# Patient Record
Sex: MALE | URBAN - METROPOLITAN AREA
[De-identification: names, ages, dates, MRNs, and addresses within clinical notes are randomized per-mention and may not be internally consistent; named-entity substitution may affect disease eponyms.]

---

## 2018-05-30 ENCOUNTER — OFFICE VISIT (OUTPATIENT)
Dept: INFECTIOUS DISEASES | Facility: CLINIC | Age: 68
End: 2018-05-30
Attending: INTERNAL MEDICINE
Payer: MEDICARE

## 2018-05-30 VITALS
HEIGHT: 74 IN | OXYGEN SATURATION: 95 % | WEIGHT: 266 LBS | TEMPERATURE: 98 F | RESPIRATION RATE: 18 BRPM | BODY MASS INDEX: 34.14 KG/M2 | SYSTOLIC BLOOD PRESSURE: 128 MMHG | DIASTOLIC BLOOD PRESSURE: 84 MMHG | HEART RATE: 63 BPM

## 2018-05-30 DIAGNOSIS — Z29.89 NEED FOR MALARIA PROPHYLAXIS: Primary | ICD-10-CM

## 2018-05-30 DIAGNOSIS — Z79.2 ENCOUNTER FOR LONG-TERM (CURRENT) USE OF ANTIBIOTICS: ICD-10-CM

## 2018-05-30 PROCEDURE — G0463 HOSPITAL OUTPT CLINIC VISIT: HCPCS | Mod: ZF

## 2018-05-30 RX ORDER — UBIDECARENONE 100 MG
100 CAPSULE ORAL DAILY
COMMUNITY
Start: 2016-10-11

## 2018-05-30 RX ORDER — ATOVAQUONE AND PROGUANIL HYDROCHLORIDE 250; 100 MG/1; MG/1
1 TABLET, FILM COATED ORAL DAILY
Qty: 24 TABLET | Refills: 0 | Status: SHIPPED | OUTPATIENT
Start: 2018-05-30 | End: 2018-06-23

## 2018-05-30 RX ORDER — ASPIRIN 81 MG/1
81 TABLET ORAL DAILY
COMMUNITY
Start: 2007-05-31

## 2018-05-30 RX ORDER — ACETAMINOPHEN AND CODEINE PHOSPHATE 120; 12 MG/5ML; MG/5ML
1 SOLUTION ORAL DAILY
COMMUNITY
Start: 2016-12-13

## 2018-05-30 RX ORDER — IBUPROFEN 800 MG/1
800 TABLET, FILM COATED ORAL EVERY 6 HOURS PRN
COMMUNITY
Start: 2016-07-05

## 2018-05-30 RX ORDER — ACETAMINOPHEN AND CODEINE PHOSPHATE 120; 12 MG/5ML; MG/5ML
1 SOLUTION ORAL
COMMUNITY
Start: 2010-11-10

## 2018-05-30 RX ORDER — DOXYCYCLINE 100 MG/1
100 CAPSULE ORAL DAILY
COMMUNITY
Start: 2018-01-15

## 2018-05-30 RX ORDER — VITAMIN B COMPLEX
1 TABLET ORAL DAILY
COMMUNITY
Start: 2016-12-13

## 2018-05-30 RX ORDER — SILDENAFIL 50 MG/1
50 TABLET, FILM COATED ORAL PRN
COMMUNITY
Start: 2017-08-08

## 2018-05-30 RX ORDER — METOPROLOL TARTRATE 25 MG/1
25 TABLET, FILM COATED ORAL 2 TIMES DAILY
COMMUNITY
Start: 2018-01-03

## 2018-05-30 RX ORDER — ATORVASTATIN CALCIUM 80 MG/1
80 TABLET, FILM COATED ORAL DAILY
COMMUNITY
Start: 2018-01-03

## 2018-05-30 RX ORDER — DIPHENOXYLATE HYDROCHLORIDE AND ATROPINE SULFATE 2.5; .025 MG/1; MG/1
1 TABLET ORAL DAILY
COMMUNITY

## 2018-05-30 RX ORDER — MULTIVIT-MIN/IRON/FOLIC ACID/K 18-600-40
500 CAPSULE ORAL DAILY
COMMUNITY

## 2018-05-30 ASSESSMENT — PAIN SCALES - GENERAL: PAINLEVEL: NO PAIN (0)

## 2018-05-30 NOTE — LETTER
5/30/2018      RE: Onel Hooker  [address]  [facility] [address]       [facility]  Infectious Disease Clinic Note     Patient:  Onel Hooker, Date of birth 1950, Medical record number [id number]  Date of Visit:  05/30/2018         Assessment and Plan:   Plan:  1) Options for malaria prophylaxis discussed:   - Doxycycline 100 mg po daily to take 2 days prior and for 4 weeks after leaving the endemic country  - Atovaquone-Proguanil (Malarone) 250/100 mg/day to take 2 days prior and 7 days after leaving the endemic country   2) Counseled using the use of mosquito repellents and bed nets at night to avoid latosha malaria      Assessment:  We discussed his options for malaria ppx based on CDC recommendations and resistance patterns for malaria ppx in individual countries. Continuing doxycycline would be an option for all of the countries that he would be visiting. We did discuss that he would not need to take doxycycline continuously when he is back in the [place] for a prolonged periods of time and can stop the medication 4 weeks after returning. Another option would taking Malarone which would only have to be taken for 7 days after returning to the [place].     Patient discussed with Dr. Dante Sharpe, Infectious Diseases Fellow  [phone]        History of the Infectious Disease Illness:     Mr. Hooker is a 66 y/o man who presents for malaria ppx recommendations. He has been taking Doxycycline 100 mg daily for the last 4 years as continuous malaria ppx. He travels to extensively throughout the year to malaria endemic countries and instead of starting and stopping the malaria ppx he decided to continue on Doxycycline indefinitely. He has not had any adverse side effects with the antibiotic.     Mr. Hooker states that for the foreseeable future he will be traveling to the [facility], [place], [place], [place], [place], [place], and [place]. The duration of time  spent in these countries varies and can be as short a 2 weeks and as long as three - four months. He usually does have some time in the [place] prior to his next trip. When he does travel he is usually in the rural areas, no big cities. 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     He did have one episode of malaria about 13 years ago when he returned back to the [place]. He was treated at [facility]. No repeat episodes of malaria since.     Past Medical History:   Gilbert disease   CAD, s/p CABG x 3   Prostate cancer s/p prostatectomy in 2008   HTN   HLD     Family History:   Father - heart disease    Mother - stroke     Social History:   No tobacco  No alcohol   Travel around the world as missionaries          Review of Systems:   CONSTITUTIONAL:  No fevers or chills  EYES: negative for icterus  ENT:  negative for hearing loss, tinnitus and sore throat  RESPIRATORY:  negative for cough with sputum and dyspnea  CARDIOVASCULAR:  negative for chest pain, dyspnea  GASTROINTESTINAL:  negative for nausea, vomiting, diarrhea and constipation  GENITOURINARY:  negative for dysuria  HEME:  No easy bruising  INTEGUMENT:  negative for rash and pruritus  NEURO:  Negative for headache         Current Medications (antimicrobials listed in bold):     Current Outpatient Prescriptions   Medication     Ascorbic Acid (VITAMIN C) 500 MG CAPS     aspirin 81 MG EC tablet     atorvastatin (LIPITOR) 80 MG tablet     atovaquone-proguanil (MALARONE) 250-100 MG per tablet     B Complex Vitamins (VITAMIN-B COMPLEX) TABS     Cholecalciferol (VITAMIN D3) 1000 units CAPS     co-enzyme Q-10 100 MG CAPS capsule     doxycycline (VIBRAMYCIN) 100 MG capsule     ibuprofen (ADVIL/MOTRIN) 800 MG tablet     methylcellulose (CITRUCEL) 500 MG TABS tablet     metoprolol tartrate (LOPRESSOR) 25 MG tablet     Multiple Vitamin (MULTI-VITAMINS) TABS     norethindrone (NOR-QD) 0.35 MG per tablet     norethindrone (NOR-QD) 0.35 MG per tablet     sildenafil  "(VIAGRA) 50 MG tablet          Allergies:   Midazolam - Cardiac arrest   Hydroxyzine          Physical Exam:   Vitals were reviewed.  All vitals stable  /84 (BP Location: Right arm, Patient Position: Sitting, Cuff Size: Adult Large)  Pulse 63  Temp 98  F (36.7  C) (Oral)  Resp 18  Ht 1.88 m (6' 2\")  Wt 120.7 kg (266 lb)  SpO2 95%  BMI 34.15 kg/m2  Exam:  GENERAL:  well-developed, well-nourished, alert, oriented, in no acute distress.  HEENT:  Head is normocephalic, atraumatic     EYES:  Eyes have anicteric sclerae.    ENT:  Oropharynx is moist without exudates or ulcers.  NECK:  Supple.  LUNGS:  Clear to auscultation.  CARDIOVASCULAR:  Regular rate and rhythm with no murmurs, gallops or rubs.  ABDOMEN:  Normal bowel sounds, soft, nontender.  SKIN:  No acute rashes.    NEUROLOGIC:  Grossly nonfocal.         Laboratory Data:     None     Infectious Disease Clinic Staff Note: Mr. Hooker was seen, examined, and the case was discussed with Dr. Sharpe, ID Fellow -- I agree with her consultative history and examination, assessment and plan in this outpatient ID Consult note. This note reflects my observations and opinions, and the plan outlined fully reflects my approach. I have reviewed the available history, radiology, laboratory results, and reports with the Fellow.    Madelin Sharpe, DO      "

## 2018-05-30 NOTE — MR AVS SNAPSHOT
"              After Visit Summary   5/30/2018    Onel Hooker    MRN: [id number]           Patient Information     Date Of Birth          1950        Visit Information        Provider Department      5/30/2018 4:00 PM Madelin Sharpe DO [facility] and Infectious Diseases        Today's Diagnoses     Need for malaria prophylaxis    -  1    Encounter for long-term (current) use of antibiotics           Follow-ups after your visit        Who to contact     If you have questions or need follow up information about today's clinic visit or your schedule please contact [facility] AND INFECTIOUS DISEASES directly at [phone].  Normal or non-critical lab and imaging results will be communicated to you by Relaboratehart, letter or phone within 4 business days after the clinic has received the results. If you do not hear from us within 7 days, please contact the clinic through Relaboratehart or phone. If you have a critical or abnormal lab result, we will notify you by phone as soon as possible.  Submit refill requests through Sion Power or call your pharmacy and they will forward the refill request to us. Please allow 3 business days for your refill to be completed.          Additional Information About Your Visit        MyChart Information     S[employer] gives you secure access to your electronic health record. If you see a primary care provider, you can also send messages to your care team and make appointments. If you have questions, please call your primary care clinic.  If you do not have a primary care provider, please call [phone] and they will assist you.        Care EveryWhere ID     This is your Care EveryWhere ID. This could be used by other organizations to access your [facility] medical records  [id number]        Your Vitals Were     Pulse Temperature Respirations Height Pulse Oximetry BMI (Body Mass Index)    63 98  F (36.7  C) (Oral) 18 1.88 m (6' 2\") 95% 34.15 kg/m2       Blood " Pressure from Last 3 Encounters:   05/30/18 128/84    Weight from Last 3 Encounters:   05/30/18 120.7 kg (266 lb)              Today, you had the following     No orders found for display         Today's Medication Changes          These changes are accurate as of 5/30/18 11:59 PM.  If you have any questions, ask your nurse or doctor.               Start taking these medicines.        Dose/Directions    atovaquone-proguanil 250-100 MG per tablet   Commonly known as:  MALARONE   Used for:  Need for malaria prophylaxis   Started by:  Madelin Sharpe DO        Dose:  1 tablet   Take 1 tablet by mouth daily for 24 days Start 2 days before travel and continue 7 days after return.   Quantity:  24 tablet   Refills:  0            Where to get your medicines      These medications were sent to [facility] [address] [place] [address]  [address] [place] [id number]     Phone:  [phone]     atovaquone-proguanil 250-100 MG per tablet                Primary Care Provider Office Phone # Fax #    Onel DORAN Luis F [phone]       [address] [address]        Equal Access to Services     VIC FARFAN AH: Hadii geoffrey tinsley hadasho Soomaali, waaxda luqadaha, qaybta kaalmada adeegyada, megan aden. So [facility] [phone].    ATENCIÓN: Si habla español, tiene a oneill disposición servicios gratuitos de asistencia lingüística. [facility] [phone].    We comply with applicable federal civil rights laws and [place] laws. We do not discriminate on the basis of race, color, national origin, age, disability, sex, sexual orientation, or gender identity.            Thank you!     Thank you for choosing [facility] AND INFECTIOUS DISEASES  for your care. Our goal is always to provide you with excellent care. Hearing back from our patients is one way we can continue to improve our services. Please take a few minutes to  complete the written survey that you may receive in the mail after your visit with us. Thank you!             Your Updated Medication List - Protect others around you: Learn how to safely use, store and throw away your medicines at www.disposemymeds.org.          This list is accurate as of 5/30/18 11:59 PM.  Always use your most recent med list.                   Brand Name Dispense Instructions for use Diagnosis    aspirin 81 MG EC tablet      Take 81 mg by mouth daily        atorvastatin 80 MG tablet    LIPITOR     Take 80 mg by mouth daily        atovaquone-proguanil 250-100 MG per tablet    MALARONE    24 tablet    Take 1 tablet by mouth daily for 24 days Start 2 days before travel and continue 7 days after return.    Need for malaria prophylaxis       CITRUCEL 500 MG Tabs tablet   Generic drug:  methylcellulose      Take 500 mg by mouth daily        co-enzyme Q-10 100 MG Caps capsule      Take 100 mg by mouth daily        doxycycline 100 MG capsule    VIBRAMYCIN     Take 100 mg by mouth daily Start taking 2 days prior to traveling and continue for 4 weeks after returning        ibuprofen 800 MG tablet    ADVIL/MOTRIN     Take 800 mg by mouth every 6 hours as needed        metoprolol tartrate 25 MG tablet    LOPRESSOR     Take 25 mg by mouth 2 times daily        MULTI-VITAMINS Tabs      Take 1 tablet by mouth daily        * NOR-QD 0.35 MG per tablet   Generic drug:  norethindrone      Take 1 tablet by mouth        * NOR-QD 0.35 MG per tablet   Generic drug:  norethindrone      Take 1 tablet by mouth daily        sildenafil 50 MG tablet    VIAGRA     Take 50 mg by mouth as needed        Vitamin C 500 MG Caps      Take 500 mg by mouth daily        vitamin D3 1000 units Caps      Take 2,000 Units by mouth daily        Vitamin-B Complex Tabs      Take 1 tablet by mouth daily        * Notice:  This list has 2 medication(s) that are the same as other medications prescribed for you. Read the directions carefully, and  ask your doctor or other care provider to review them with you.

## 2018-05-30 NOTE — NURSING NOTE
"Chief Complaint   Patient presents with     Consult     Lymes disease       /84 (BP Location: Right arm, Patient Position: Sitting, Cuff Size: Adult Large)  Pulse 63  Temp 98  F (36.7  C) (Oral)  Resp 18  Ht 1.88 m (6' 2\")  Wt 120.7 kg (266 lb)  SpO2 95%  BMI 34.15 kg/m2    [facility]  5/30/2018 4:11 PM      "

## 2018-05-30 NOTE — PROGRESS NOTES
[facility]  Infectious Disease Clinic Note     Patient:  Onel Hooker, Date of birth 1950, Medical record number [id number]  Date of Visit:  05/30/2018         Assessment and Plan:   Plan:  1) Options for malaria prophylaxis discussed:   - Doxycycline 100 mg po daily to take 2 days prior and for 4 weeks after leaving the endemic country  - Atovaquone-Proguanil (Malarone) 250/100 mg/day to take 2 days prior and 7 days after leaving the endemic country   2) Counseled using the use of mosquito repellents and bed nets at night to avoid latosha malaria      Assessment:  We discussed his options for malaria ppx based on CDC recommendations and resistance patterns for malaria ppx in individual countries. Continuing doxycycline would be an option for all of the countries that he would be visiting. We did discuss that he would not need to take doxycycline continuously when he is back in the [place] for a prolonged periods of time and can stop the medication 4 weeks after returning. Another option would taking Malarone which would only have to be taken for 7 days after returning to the [place].     Patient discussed with Dr. Dante Sharpe, Infectious Diseases Fellow  [phone]        History of the Infectious Disease Illness:     Mr. Hooker is a 68 y/o man who presents for malaria ppx recommendations. He has been taking Doxycycline 100 mg daily for the last 4 years as continuous malaria ppx. He travels to extensively throughout the year to malaria endemic countries and instead of starting and stopping the malaria ppx he decided to continue on Doxycycline indefinitely. He has not had any adverse side effects with the antibiotic.     Mr. Hooker states that for the foreseeable future he will be traveling to the [facility], [place], [place], [place], [place], [place], and [place]. The duration of time spent in these countries varies and can be as short a 2 weeks and as long as  three - four months. He usually does have some time in the [place] prior to his next trip. When he does travel he is usually in the rural areas, no big cities. 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     He did have one episode of malaria about 13 years ago when he returned back to the [place]. He was treated at [facility]. No repeat episodes of malaria since.     Past Medical History:   Gilbert disease   CAD, s/p CABG x 3   Prostate cancer s/p prostatectomy in 2008   HTN   HLD     Family History:   Father - heart disease    Mother - stroke     Social History:   No tobacco  No alcohol   Travel around the world as missionaries          Review of Systems:   CONSTITUTIONAL:  No fevers or chills  EYES: negative for icterus  ENT:  negative for hearing loss, tinnitus and sore throat  RESPIRATORY:  negative for cough with sputum and dyspnea  CARDIOVASCULAR:  negative for chest pain, dyspnea  GASTROINTESTINAL:  negative for nausea, vomiting, diarrhea and constipation  GENITOURINARY:  negative for dysuria  HEME:  No easy bruising  INTEGUMENT:  negative for rash and pruritus  NEURO:  Negative for headache         Current Medications (antimicrobials listed in bold):     Current Outpatient Prescriptions   Medication     Ascorbic Acid (VITAMIN C) 500 MG CAPS     aspirin 81 MG EC tablet     atorvastatin (LIPITOR) 80 MG tablet     atovaquone-proguanil (MALARONE) 250-100 MG per tablet     B Complex Vitamins (VITAMIN-B COMPLEX) TABS     Cholecalciferol (VITAMIN D3) 1000 units CAPS     co-enzyme Q-10 100 MG CAPS capsule     doxycycline (VIBRAMYCIN) 100 MG capsule     ibuprofen (ADVIL/MOTRIN) 800 MG tablet     methylcellulose (CITRUCEL) 500 MG TABS tablet     metoprolol tartrate (LOPRESSOR) 25 MG tablet     Multiple Vitamin (MULTI-VITAMINS) TABS     norethindrone (NOR-QD) 0.35 MG per tablet     norethindrone (NOR-QD) 0.35 MG per tablet     sildenafil (VIAGRA) 50 MG tablet          Allergies:   Midazolam - Cardiac arrest  "  Hydroxyzine          Physical Exam:   Vitals were reviewed.  All vitals stable  /84 (BP Location: Right arm, Patient Position: Sitting, Cuff Size: Adult Large)  Pulse 63  Temp 98  F (36.7  C) (Oral)  Resp 18  Ht 1.88 m (6' 2\")  Wt 120.7 kg (266 lb)  SpO2 95%  BMI 34.15 kg/m2  Exam:  GENERAL:  well-developed, well-nourished, alert, oriented, in no acute distress.  HEENT:  Head is normocephalic, atraumatic     EYES:  Eyes have anicteric sclerae.    ENT:  Oropharynx is moist without exudates or ulcers.  NECK:  Supple.  LUNGS:  Clear to auscultation.  CARDIOVASCULAR:  Regular rate and rhythm with no murmurs, gallops or rubs.  ABDOMEN:  Normal bowel sounds, soft, nontender.  SKIN:  No acute rashes.    NEUROLOGIC:  Grossly nonfocal.         Laboratory Data:     None   "

## 2018-06-01 PROBLEM — Z29.89 NEED FOR MALARIA PROPHYLAXIS: Status: ACTIVE | Noted: 2018-06-01

## 2018-06-01 PROBLEM — Z29.89 NEED FOR MALARIA PROPHYLAXIS: Status: ACTIVE | Noted: 2018-05-30

## 2018-06-01 NOTE — PROGRESS NOTES
Infectious Disease Clinic Staff Note: Mr. Hooker was seen, examined, and the case was discussed with Dr. Sharpe, ID Fellow -- I agree with her consultative history and examination, assessment and plan in this outpatient ID Consult note. This note reflects my observations and opinions, and the plan outlined fully reflects my approach. I have reviewed the available history, radiology, laboratory results, and reports with the Fellow.

## 2019-04-05 ENCOUNTER — TRANSFERRED RECORDS (OUTPATIENT)
Dept: HEALTH INFORMATION MANAGEMENT | Facility: CLINIC | Age: 69
End: 2019-04-05

## 2019-04-05 LAB
CREAT SERPL-MCNC: 0.87 MG/DL (ref 0.72–1.25)
GFR SERPL CREATININE-BSD FRML MDRD: >60 ML/MIN/1.73M2
GLUCOSE SERPL-MCNC: 87 MG/DL (ref 65–100)
POTASSIUM SERPL-SCNC: 4.1 MMOL/L (ref 3.5–5)

## 2019-04-05 ASSESSMENT — MIFFLIN-ST. JEOR: SCORE: 1999.1

## 2019-04-06 ENCOUNTER — ANESTHESIA - HEALTHEAST (OUTPATIENT)
Dept: SURGERY | Facility: HOSPITAL | Age: 69
End: 2019-04-06

## 2019-04-08 ENCOUNTER — SURGERY - HEALTHEAST (OUTPATIENT)
Dept: SURGERY | Facility: HOSPITAL | Age: 69
End: 2019-04-08

## 2019-10-02 ENCOUNTER — HEALTH MAINTENANCE LETTER (OUTPATIENT)
Age: 69
End: 2019-10-02

## 2019-12-15 ENCOUNTER — HEALTH MAINTENANCE LETTER (OUTPATIENT)
Age: 69
End: 2019-12-15

## 2021-01-15 ENCOUNTER — HEALTH MAINTENANCE LETTER (OUTPATIENT)
Age: 71
End: 2021-01-15

## 2021-05-27 ENCOUNTER — RECORDS - HEALTHEAST (OUTPATIENT)
Dept: ADMINISTRATIVE | Facility: CLINIC | Age: 71
End: 2021-05-27
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
## 2021-05-27 NOTE — ANESTHESIA POSTPROCEDURE EVALUATION
Patient: Onel Hooker  CYSTOSCOPY, LEFT URETEROSCOPY, HOLMIUM LASER LITHOTRIPSY, LEFT URETERAL STENT PLACEMENT  Anesthesia type: general    Patient location: PACU  Last vitals:   Vitals Value Taken Time   /88 4/8/2019  1:30 PM   Temp 36.5  C (97.7  F) 4/8/2019 12:00 PM   Pulse 75 4/8/2019  1:32 PM   Resp 14 4/8/2019 12:41 PM   SpO2 93 % 4/8/2019  1:33 PM   Vitals shown include unvalidated device data.  Post vital signs: stable  Level of consciousness: awake and responds to simple questions  Post-anesthesia pain: pain controlled  Post-anesthesia nausea and vomiting: no  Pulmonary: unassisted, return to baseline  Cardiovascular: stable and blood pressure at baseline  Hydration: adequate  Anesthetic events: no    QCDR Measures:  ASA# 11 - Shayna-op Cardiac Arrest: ASA11B - Patient did NOT experience unanticipated cardiac arrest  ASA# 12 - Shayna-op Mortality Rate: ASA12B - Patient did NOT die  ASA# 13 - PACU Re-Intubation Rate: ASA13B - Patient did NOT require a new airway mgmt  ASA# 10 - Composite Anes Safety: ASA10A - No serious adverse event    Additional Notes:

## 2021-05-27 NOTE — ANESTHESIA CARE TRANSFER NOTE
Last vitals:   Vitals:    04/08/19 1115   BP: (!) 145/93   Pulse: 84   Resp: 12   Temp: 37.2  C (99  F)   SpO2: 99%     Patient's level of consciousness is drowsy  Spontaneous respirations: yes  Maintains airway independently: yes  Dentition unchanged: yes  Oropharynx: oropharynx clear of all foreign objects    QCDR Measures:  ASA# 20 - Surgical Safety Checklist: WHO surgical safety checklist completed prior to induction    PQRS# 430 - Adult PONV Prevention: 4558F - Pt received => 2 anti-emetic agents (different classes) preop & intraop  ASA# 8 - Peds PONV Prevention: NA - Not pediatric patient, not GA or 2 or more risk factors NOT present  PQRS# 424 - Shayna-op Temp Management: 4559F - At least one body temp DOCUMENTED => 35.5C or 95.9F within required timeframe  PQRS# 426 - PACU Transfer Protocol: - Transfer of care checklist used  ASA# 14 - Acute Post-op Pain: ASA14B - Patient did NOT experience pain >= 7 out of 10

## 2021-05-31 ENCOUNTER — RECORDS - HEALTHEAST (OUTPATIENT)
Dept: ADMINISTRATIVE | Facility: CLINIC | Age: 71
End: 2021-05-31

## 2021-06-02 VITALS — HEIGHT: 74 IN | BODY MASS INDEX: 33.37 KG/M2 | WEIGHT: 260 LBS

## 2021-09-04 ENCOUNTER — HEALTH MAINTENANCE LETTER (OUTPATIENT)
Age: 71
End: 2021-09-04

## 2022-02-19 ENCOUNTER — HEALTH MAINTENANCE LETTER (OUTPATIENT)
Age: 72
End: 2022-02-19

## 2022-10-22 ENCOUNTER — HEALTH MAINTENANCE LETTER (OUTPATIENT)
Age: 72
End: 2022-10-22

## 2023-04-01 ENCOUNTER — HEALTH MAINTENANCE LETTER (OUTPATIENT)
Age: 73
End: 2023-04-01